# Patient Record
Sex: FEMALE | Race: WHITE | NOT HISPANIC OR LATINO | Employment: FULL TIME | URBAN - METROPOLITAN AREA
[De-identification: names, ages, dates, MRNs, and addresses within clinical notes are randomized per-mention and may not be internally consistent; named-entity substitution may affect disease eponyms.]

---

## 2024-03-25 ENCOUNTER — HOSPITAL ENCOUNTER (EMERGENCY)
Facility: CLINIC | Age: 29
Discharge: HOME | End: 2024-03-26
Attending: EMERGENCY MEDICINE | Admitting: EMERGENCY MEDICINE
Payer: OTHER GOVERNMENT

## 2024-03-25 DIAGNOSIS — F10.920 ALCOHOLIC INTOXICATION WITHOUT COMPLICATION (H): ICD-10-CM

## 2024-03-25 PROBLEM — F10.90 ALCOHOL USE DISORDER: Status: ACTIVE | Noted: 2024-03-25

## 2024-03-25 PROBLEM — F41.9 ANXIETY: Status: ACTIVE | Noted: 2024-03-25

## 2024-03-25 PROCEDURE — 250N000013 HC RX MED GY IP 250 OP 250 PS 637: Performed by: EMERGENCY MEDICINE

## 2024-03-25 PROCEDURE — 250N000013 HC RX MED GY IP 250 OP 250 PS 637: Performed by: NURSE PRACTITIONER

## 2024-03-25 PROCEDURE — 99285 EMERGENCY DEPT VISIT HI MDM: CPT

## 2024-03-25 PROCEDURE — 99283 EMERGENCY DEPT VISIT LOW MDM: CPT | Performed by: NURSE PRACTITIONER

## 2024-03-25 RX ORDER — HYDROXYZINE HYDROCHLORIDE 25 MG/1
25 TABLET, FILM COATED ORAL 2 TIMES DAILY PRN
COMMUNITY

## 2024-03-25 RX ORDER — PROPRANOLOL HYDROCHLORIDE 10 MG/1
10 TABLET ORAL 2 TIMES DAILY
Status: DISCONTINUED | OUTPATIENT
Start: 2024-03-25 | End: 2024-03-26 | Stop reason: HOSPADM

## 2024-03-25 RX ORDER — OLANZAPINE 10 MG/1
10 TABLET, ORALLY DISINTEGRATING ORAL AT BEDTIME
Status: DISCONTINUED | OUTPATIENT
Start: 2024-03-25 | End: 2024-03-25

## 2024-03-25 RX ORDER — HYDROXYZINE HYDROCHLORIDE 25 MG/1
25 TABLET, FILM COATED ORAL 2 TIMES DAILY PRN
Status: DISCONTINUED | OUTPATIENT
Start: 2024-03-25 | End: 2024-03-26 | Stop reason: HOSPADM

## 2024-03-25 RX ORDER — PROPRANOLOL HYDROCHLORIDE 10 MG/1
10 TABLET ORAL 2 TIMES DAILY
COMMUNITY

## 2024-03-25 RX ORDER — ACETAMINOPHEN 325 MG/1
650 TABLET ORAL EVERY 6 HOURS PRN
Status: DISCONTINUED | OUTPATIENT
Start: 2024-03-25 | End: 2024-03-26 | Stop reason: HOSPADM

## 2024-03-25 RX ADMIN — NICOTINE POLACRILEX 2 MG: 2 GUM, CHEWING ORAL at 00:30

## 2024-03-25 RX ADMIN — OLANZAPINE 10 MG: 10 TABLET, ORALLY DISINTEGRATING ORAL at 02:27

## 2024-03-25 RX ADMIN — HYDROXYZINE HYDROCHLORIDE 25 MG: 25 TABLET, FILM COATED ORAL at 21:14

## 2024-03-25 RX ADMIN — HYDROXYZINE HYDROCHLORIDE 25 MG: 25 TABLET, FILM COATED ORAL at 14:27

## 2024-03-25 NOTE — ED PROVIDER NOTES
History     Chief Complaint:  Alcohol Intoxication       HPI   Theresa Jackson is a 28 year old female, with a hx of bipolar disorder, was brought in by EMS after being denied boarding at the airport due to intoxication. The patient admits to drinking tonight prior to boarding her flight, per EMS. Patient states she is having a mental breakdown and has been exhibiting suicidal ideation per EMS. EMS reports the patient has had a suicide attempt in June.     Independent Historian:   EMS and the patient as reported above in the HPI.     Medications:    The patient is not currently taking any prescribed medications.    Past Medical History:    Bipolar disorder     Past Surgical History:    None.      Physical Exam   Patient Vitals for the past 24 hrs:   BP Temp Temp src Pulse Resp SpO2 Weight   03/25/24 0019 128/74 97.5  F (36.4  C) Temporal 100 20 100 % 72.6 kg (160 lb)        Physical Exam  General: In 5 point restraints.  Screaming.  Head:  The scalp, face, and head appear normal  Mouth/Throat: Mucus membranes are moist  CV:  Regular rate    Normal S1 and S2  No pathological murmur   Resp:  Breath sounds clear and equal bilaterally    Non-labored, no retractions or accessory muscle use    No coarseness    No wheezing   GI:  Abdomen is soft, no rigidity    No tenderness to palpation  MS:  Normal motor assessment of all extremities.    Good capillary refill noted.  Skin:  No rash or lesions noted.  Neuro:   Does not cooperate with exam but does move all extremities with good strength.  Speech is clear and fluent.  Psych: Labile mood consistent with reported intoxication.  Patient does report suicidal thoughts.  Poor insight.  Poor judgment.      Emergency Department Course     Emergency Department Course & Assessments:    Interventions:  Medications   nicotine (NICORETTE) gum 2 mg (2 mg Buccal $Given 3/25/24 0030)   OLANZapine zydis (zyPREXA) ODT tab 10 mg (10 mg Oral $Given 3/25/24 8509)      Assessments:  0004     I obtained history and examined the patient as noted above.     Independent Interpretation (X-rays, CTs, rhythm strip):  None    Consultations/Discussion of Management or Tests:  None        Social Determinants of Health affecting care:   None    Disposition:  Care of the patient was transferred to my colleague pending sobriety and DEC assessment.     Impression & Plan      Medical Decision Making:    Theresa Jackson is a 28 year old female who presents for evaluation of alcohol intoxication.  They are intoxicated here in ED by lab evaluation.   Patient has no history of Delirium tremens or alcohol withdrawal seizures. There are no signs of trauma related to alcohol use and no further workup is needed including head CT.     The patient also reported suicidal thoughts.  She will be assessed by one of our mental health assessors this morning.  She is signed out to my partner pending DEC assessment.    Diagnosis:    ICD-10-CM    1. Alcoholic intoxication without complication (H24)  F10.920       2. Verbalizes suicidal thoughts  R45.851              Scribe Disclosure:  Scribe Disclosure:  I, Arcadio Arango, am serving as a scribe at 12:19 AM on 3/25/2024 to document services personally performed by Munira Frazier MD based on my observations and the provider's statements to me.  Munira Frazier MD Taxman, Karah M, MD  03/25/24 7320

## 2024-03-25 NOTE — ED NOTES
Bed: ST02  Expected date:   Expected time:   Means of arrival:   Comments:  [age]F restrained, ETOH, from airport

## 2024-03-25 NOTE — ED NOTES
Patient took her prescribed medications Propanolol 10 mg and hydroxyzine under staff supervision and medication returned to her belonging bag and locked back up.

## 2024-03-25 NOTE — ED NOTES
Patient reported that she is here in [place] to visit her son, and she had a couple of drinks, and the airline did not let her fly.  She is still very tired and sleeping and does not want to eat breakfast. Commander Mccarthy called her number is , and  .  Patient is supposed to be flying to [facility] or [facility]. They will margarita back with information.

## 2024-03-25 NOTE — ED NOTES
3/25/2024  Theresa Jackson 1995     Writer consulted with ED nurse,on this date at  5:25 AM. It was determined that pt would not benefit from assessment at this time due to Pt unable able to participate in assessment due to sleeping as she was recently given Zyprexa.     ED will check in with [facility] staff when pt is ready and able to participate in assessment.     NIRALI KhalilSW

## 2024-03-25 NOTE — ED PROVIDER NOTES
"[facility] Unit - Psychiatry  Combined Observation Note and Discharge Summary  [facility] Emergency Department  Observation Initiation Date: Mar 25, 2024    Theresa Jackson MRN: [id number]   Age: 28 year old YOB: 1995     History     Chief Complaint   Patient presents with    Alcohol Intoxication     HPI  Theresa Jackson is a 28 year old female with no notable past medical history who presented to the emergency department for evaluation from the airport after she was denied boarding of her plane due to appearing intoxicated. Specific details are unknown. Patient was medically evaluated in the emergency department and determined to be medically stable for transfer to [facility] for further psychiatric assessment. Here at [facility], patient was not interested in discussing events leading to emergency department visit. She is no longer intoxicated. She endorses a plan to return to [place] when she is able to leave the hospital, was in [place] visiting her son. She reports that she has psychiatry and therapy arranged in [place]. She presently denies any suicidal or homicidal thinking. There is no evidence of psychosis or titus. She was encouraged to follow-up with providers in [place].       Past Medical History  No past medical history on file.  No past surgical history on file.  hydrOXYzine HCl (ATARAX) 25 MG tablet  propranolol (INDERAL) 10 MG tablet      Not on File  Family History  No family history on file.  Social History       Past medical history, past surgical history, medications, allergies, family history, and social history were reviewed with the patient. No additional pertinent items.       Review of Systems  A medically appropriate review of systems was performed with pertinent positives and negatives noted in the HPI, and all other systems negative.    Physical Examination   BP: 128/74  Pulse: 100  Temp: 97.5  F (36.4  C)  Resp: 20  Height: 165.1 cm (5' 5\")  Weight: 72.6 kg (160 lb)  SpO2: 100 " %    Physical Exam  General: Appears stated age.   Neuro: Alert and fully oriented. Extremities appear to demonstrate normal strength on visual inspection.   Integumentary/Skin: no rash visualized, normal color    Psychiatric Examination   Appearance: awake, alert, adequately groomed, appeared as age stated, and casually dressed  Attitude:  cooperative  Eye Contact:  good  Mood:   fine  Affect:  mood congruent and intensity is normal  Speech:  clear, coherent and normal prosody  Psychomotor Behavior:  no evidence of tardive dyskinesia, dystonia, or tics  Thought Process:  linear and goal oriented  Associations:  no loose associations  Thought Content:  no evidence of suicidal ideation or homicidal ideation, no evidence of psychotic thought, no auditory hallucinations present, and no visual hallucinations present  Insight:  fair  Judgement:  fair  Oriented to:  time, person, and place  Attention Span and Concentration:  intact  Recent and Remote Memory:  intact  Language: able to name/identify objects without impairment  Fund of Knowledge: intact with awareness of current and past events    ED Course        Labs Ordered and Resulted from Time of ED Arrival to Time of ED Departure - No data to display    Assessments & Plan (with Medical Decision Making)   Patient presenting with alcohol intoxication from the airport after she was denied boarding due to intoxication. There was mention of suicidal thoughts last evening, which she currently denies. She has mental health services in [place] and will follow-up there. Nursing notes reviewed noting no acute issues.     I have reviewed the assessment completed by the [facility].     During the observation period, the patient did not require medications for agitation, and did not require restraints/seclusion for patient and/or provider safety.    The patient was found to have a psychiatric condition that would benefit from an observation stay in the emergency department for further  psychiatric stabilization and/or coordination of a safe disposition. The observation plan includes serial assessments of psychiatric condition, potential administration of medications if indicated, further disposition pending the patient's psychiatric course during the monitoring period.     Preliminary diagnosis:    ICD-10-CM    1. Alcoholic intoxication without complication (H24)  F10.920            Treatment Plan:  - Patient requests discharge from the hospital. She does not currently meet criteria for an involuntary hold - she is not intoxicated. She denies suicidal or homicidal thinking. No evidence of psychosis or titus. She will be discharged.   - Continue propranolol and hydroxyzine PRN  - Continue to follow-up with outpatient psychiatrist and psychotherapist in [place]  - Abstain from alcohol or any non-prescribed mind or mood-altering substances      ADDENDUM @ 0857 3/25/24:  Patient agrees to stay until 0800 on 3/26/24 when she will be picked up by two sergeants from the . They will accompany patient back to [place]. She remains at [facility] voluntarily and may be discharged if she demands to leave prior to 0800.         After the period in observation care, the patient's circumstances and mental state were safe for outpatient management. After counseling on the diagnosis, work-up, and treatment plan, the patient was discharged. Close follow-up with a psychiatrist and/or therapist was recommended and community psychiatric resources were provided. Patient is to return to the ED if any urgent or potentially life-threatening concerns.      At the time of discharge, the patient's acute suicide risk was determined to be low due to the following factors: Reduction in the intensity of mood/anxiety symptoms that preceded the admission, denial of suicidal thoughts, denies feeling helpless or helpless, not currently under the influence of alcohol or illicit substances, denies experiencing command hallucinations,  no immediate access to firearms. The patient's acute risk could be higher if noncompliant with their treatment plan, medications, follow-up appointments or using illicit substances or alcohol. Protective factors include: social supports, stable housing, employment    --  Alexis Breen CNP   [facility] EMERGENCY DEPT  [facility] Unit         Alexis Breen CNP  03/25/24 6751       Alexis Breen CNP  03/25/24 1415

## 2024-03-25 NOTE — ED NOTES
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  She reports that two Staff Sgts will arriving at 0800 on 3/26/2024 to escort patient back to treatment at the recovery unit in [place].  The Staff Sgts will be [place].   If patient or staff have questions, Fredis can be reached on her government cell phone of [phone].     Patient is not aware of this yet as she is currently sleeping in her recliner and RN recommends letting patient sleep as she was irritable in the transfer from ED to [facility].       Jean Meyer, NERISSA, [facility]  Licensed Mental Health Professional (LMHP)  [facility], [phone]

## 2024-03-25 NOTE — ED NOTES
Patient given a phone call on her cell phone in a secure area away from other patients for help in contacting her job.

## 2024-03-25 NOTE — ED PROVIDER NOTES
ED signout    Patient arrives intoxicated making suicidal statements.  Was pending DEC evaluation.  Ultimately transferred to [facility] for further evaluation.     Amanda Mason,   03/25/24 104

## 2024-03-25 NOTE — ED NOTES
Patient escorted from  common area where he is sleeping and helped to her room since she is drowsy. Pt escorted to her room due to safety concerns with another patient pacing in the common area. Patient advised her door is being closed for her privacy while she is sleeping but it is not locked. Patient verbalized understanding.

## 2024-03-25 NOTE — CONSULTS
"Diagnostic Evaluation Consultation  Crisis Assessment    Patient Name: Theresa Jackson  Age:  28 year old  Legal Sex: female  Gender Identity: female  Pronouns: she/her  Race: Data Unavailable  Ethnicity: Data Unavailable  Language: Data Unavailable      Patient was assessed: In person Crisis Assessment Start Time: 1315 Crisis Assessment Stop Time: 1320  Patient location: [facility] EMERGENCY DEPT                             [id number]    Referral Data and Chief Complaint  Theresa Jackson presents to the ED via EMS. Patient is presenting to the ED for the following concerns: Intoxication, Worsening psychosocial stress.       Factors that make the mental health crisis life threatening or complex are:  Patient declines to meet with writer for DEC Assessment as she is \"confused as to why I am still here in the hospital.\"  Writer reviewed notes from time of arrival in the ED where patient was noted to be intoxicated at the airport, refused admittance on the plane, and making suicidal statements.  Patient denies suicidal ideations then and now.  Writer informed patient that Fredis has been in contact with hospital staff this morning to let us know that two Staff Sgts will be flying here [facility], getting patient in the morning and escorting her back to [place].  Patient stated her brother-in-law was already en route from [place] to take her back to her [place] for a 1300 pre-op appointment tomorrow.  Patient was given Fredis's work phone number so she can communicate/coordinate with her about escorted transportation need.  Patient presents as flat affect, soft spoken, guarded and annoyed..      Informed Consent and Assessment Methods  Explained the crisis assessment process, including applicable information disclosures and limits to confidentiality, assessed understanding of the process, and obtained consent to proceed with the assessment.  Assessment methods included conducting a formal interview with " patient, review of medical records, collaboration with medical staff, and obtaining relevant collateral information from family and community providers when available.  : done     Patient response to interventions: unacceptance expressed  Coping skills were attempted to reduce the crisis:  unable to assess as patient did not participate in the assessment     History of the Crisis   Patient was at [facility] airport, got intoxicated and was refused boarding to the plane trying to return to [place].    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[employer]  Source of Income:   (unable to assess as patient did not participate in the assessment)  Financial Environmental Concerns:   (unable to assess as patient did not participate in the assessment)  Current Hobbies:   (unable to assess as patient did not participate in the assessment)  Barriers in Personal Life:  mental health concerns (alcohol use)    Significant Clinical History  Current Anxiety Symptoms:  anxious  Current Depression/Trauma:  impaired decision making, sadness  Current Somatic Symptoms:  anxious  Current Psychosis/Thought Disturbance:   (none observed)  Current Eating Symptoms:   (no changes)  Chemical Use History:  Alcohol: Binge  Last Use:: 03/24/24  Benzodiazepines:  (unable to assess as patient did not participate in the assessment)  Opiates:  (unable to assess as patient did not participate in the assessment)  Cocaine:  (unable to assess as patient did not participate in the assessment)  Marijuana:  (unable to assess as patient did not participate in the assessment)  Other Use:  (unable to assess as patient did not participate in the assessment)  Withdrawal Symptoms:  (none noted)  Addictions:  (none reported)   Past diagnosis:  Depression, Anxiety Disorder  Family history:  No known history of mental health or chemical health concerns  Past treatment:   Individual therapy, Psychiatric Medication Management  Details of most recent treatment:  She reports having therapy, psychiatry, and social work established in [place].       Collateral Information  Is there collateral information: No (no contacts on file and declined to provide contact information)        Risk Assessment  [place] Suicide Severity Rating Scale Full Clinical Version:  Suicidal Ideation  Q6 Suicide Behavior (Lifetime): yes          [place] Suicide Severity Rating Scale Recent:   Suicidal Ideation (Recent)  Q1 Wished to be Dead (Past Month): no  Q2 Suicidal Thoughts (Past Month): no  Q3 Suicidal Thought Method: no  Q4 Suicidal Intent without Specific Plan: yes  Q5 Suicide Intent with Specific Plan: no  Within the Past 3 Months?: yes  Level of Risk per Screen: no risks indicated     Suicidal Behavior (Recent)  Actual Attempt (Past 3 Months): No  Has subject engaged in non-suicidal self-injurious behavior? (Past 3 Months): No  Interrupted Attempts (Past 3 Months): No  Aborted or Self-Interrupted Attempt (Past 3 Months): No  Preparatory Acts or Behavior (Past 3 Months): No    Environmental or Psychosocial Events:  (unable to assess as patient did not participate in the assessment)  Protective Factors: Protective Factors: strong bond to family unit, community support, or employment, responsibilities and duties to others, including pets and children, lives in a responsibly safe and stable environment, good treatment engagement, supportive ongoing medical and mental health care relationships    Does the patient have thoughts of harming others? Feels Like Hurting Others: no  Previous Attempt to Hurt Others: no  Is the patient engaging in sexually inappropriate behavior?: no    Is the patient engaging in sexually inappropriate behavior?  no        Mental Status Exam   Affect: Flat  Appearance: Appropriate  Attention Span/Concentration: Attentive  Eye Contact: Engaged    Fund of Knowledge: Appropriate    Language /Speech Content: Fluent  Language /Speech Volume: Soft  Language /Speech Rate/Productions: Normal  Recent Memory: Variable  Remote Memory: Intact  Mood: Anxious  Orientation to Person: Yes   Orientation to Place: Yes  Orientation to Time of Day: Yes  Orientation to Date: Yes     Situation (Do they understand why they are here?): Yes  Psychomotor Behavior: Normal  Thought Content: Clear  Thought Form: Intact     Medication  Psychotropic medications:   Medication Orders - Psychiatric (From admission, onward)      Start     Dose/Rate Route Frequency Ordered Stop    03/25/24 0215  OLANZapine zydis (zyPREXA) ODT tab 10 mg         10 mg Oral AT BEDTIME 03/25/24 0210      03/25/24 0028  nicotine (NICORETTE) gum 2 mg         2 mg Buccal EVERY 1 HOUR PRN 03/25/24 0028               Current Care Team  No care team member to display    Diagnosis  Patient Active Problem List   Diagnosis Code    Anxiety F41.9    Alcohol use disorder F10.90       Primary Problem This Admission  Active Hospital Problems    Anxiety      Alcohol use disorder        Clinical Summary and Substantiation of Recommendations   Patient is declining to participate in crisis assessment as she is confused as to why she is here in the hospital.  Patient was informed that she was intoxicated at the airport, was not allowed to board the plane, and brought to the ED due to  making suicidal statements.  Patient reports she is not suicidal and has mental health services established in [place].  Patient was informed that the , Fredis, has been in communciation with us regarding sending two Staff Sgts here to pick her up in the morning and escort her back to [place].  Patient briefly met with provider as well and declined needing any further support or assistance.  Patient is agreement to stay on [facility] voluntarily until [employer] escort arrives at 0800 on 3/26/24 to bring her back to [place].        Disposition  Recommended disposition: Individual  Therapy, Medication Management        Reviewed case and recommendations with attending provider. Attending Name: Iam Breen CNP       Attending concurs with disposition: yes       Patient and/or validated legal guardian concurs with disposition:  yes       Final disposition:  discharge at 0800 on 3/26/24 via [facility] Staff Sgt escort    Legal status on admission: Voluntary/Patient has signed consent for treatment    Assessment Details   Total duration spent with the patient: 5 min     CPT code(s) utilized: Non-Billable    NERISSA Hines, LICSW  Licensed Mental Health Professional (LMHP)  [facility], [phone]

## 2024-03-25 NOTE — ED NOTES
Patient is agreeable to staying on [facility] until 0800 on 3/26/24 when two Staff Sgts from the [employer] arrive to escort her back to [place].  If patient is asking to discharge after this note entry and before [employer] escort arrives, we are requested to call patient's [employer] Chain of Command to inform them of patient's discharge.  Company Commander in the next in chain of command:  office number is [phone] and cell phone is [phone].    NERISSA Hines, [facility] Hospital[facility]  Licensed Mental Health Professional (LMHP)  [facility], [phone]

## 2024-03-25 NOTE — ED NOTES
Bed: [facility]  Expected date:   Expected time:   Means of arrival:   Comments:  STAB 2

## 2024-03-25 NOTE — ED TRIAGE NOTES
"Patient presents to ED via EMS. Per report, patient comes from the airport where she was refused boarding related to ETOH intoxication. Resisted transport and placed on a hold and in restraints for transport. States she is \"Having a mental breakdown that caused her to drink\"         "

## 2024-03-25 NOTE — ED NOTES
Patient is awake, she is trying to eat breakfast, she appears to be extremely tired, she just spoke with her commander and she is upset.
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
## 2024-03-25 NOTE — ED NOTES
Patient laying down in the  common area awake with a blanket covering her whole she is trying to take a nap.

## 2024-03-25 NOTE — PROGRESS NOTES
[facility]  ED to EMPATH Checklist:      Goal for EMPATH: Bipolar    Current Behavior: Sad    Safety Concerns: None    Legal Hold Status: [facility]    Medically Cleared by ED provider: Yes    Patient Therapeutically Searched: Done    Belongings: In room locker    Independent Ambulation at Baseline: Yes    Participates in Care/Conversation: Yes    Patient Informed about EMPATH: Yes    DEC: yes    Patient Ready to be Transferred to EMPATH? Yes

## 2024-03-25 NOTE — PROGRESS NOTES
"Patient is making a phone call to arrange a flight back to [place], she stated  \" I am mad at my self\"   Patient will be seeing by a DEC  .  "

## 2024-03-26 VITALS
HEIGHT: 65 IN | WEIGHT: 160 LBS | SYSTOLIC BLOOD PRESSURE: 108 MMHG | OXYGEN SATURATION: 98 % | HEART RATE: 68 BPM | TEMPERATURE: 98.1 F | BODY MASS INDEX: 26.66 KG/M2 | RESPIRATION RATE: 18 BRPM | DIASTOLIC BLOOD PRESSURE: 60 MMHG

## 2024-03-26 PROCEDURE — 250N000013 HC RX MED GY IP 250 OP 250 PS 637: Performed by: NURSE PRACTITIONER

## 2024-03-26 RX ADMIN — PROPRANOLOL HYDROCHLORIDE 10 MG: 10 TABLET ORAL at 08:01

## 2024-03-26 ASSESSMENT — ACTIVITIES OF DAILY LIVING (ADL)
ADLS_ACUITY_SCORE: 35

## 2024-03-26 NOTE — PROGRESS NOTES
Writer checked in with patient as she was eating breakfast to see if there was anything else from a therapy/social work standpoint needed.  Patient states she is good this morning.  Writer informed patient that RN was on the phone now coordinating with one of the Staff Sgts giving them direction on how to pick patient up when they get here.   Patient presented with bright affect, positive mood, and engaged.     Jean Meyer, NERISSA, LICSW

## 2024-03-26 NOTE — ED NOTES
Patient resting in lounge this shift. Eating, drinking and taking medications. Pleasant and cooperative with staff and peers. Patient showered this shift and changed clothes. Patient said she is ready for the plane ride back to [place] tomorrow. She regrets drinking at the airport after maintaining her sobriety. She is looking forward to May when she will be discharged from the  & can move back to [place] to be close to her three year old son.

## 2024-03-26 NOTE — DISCHARGE INSTRUCTIONS
Aftercare Plan  If I am feeling unsafe or I am in a crisis, I will:   Contact my established care providers   Call the National Suicide Prevention Lifeline: 988  Go to the nearest emergency room   Call 911     Continue to meet with your established providers in [place], following all recommendations of the [employer] as well    -I will abstain from all mood altering chemicals not currently prescribed to me       -I will attend scheduled mental health therapy and psychiatric appointments and follow all recommendations      -I will commit to 30 minutes of self care daily - this can be as simple as taking a shower, going for a walk, cooking a meal, read, writing, etc      -I will practice square breathing when I begin to feel anxious - in breath through the nose for the count of 4 and the first line on the square. Out breath through the mouth for the count of 4 for the second line of the square. Repeat to complete the square. Repeat the square as many times as needed.      - I will use distraction skills of: going for walks, watching TV, spending time outside, calling a friend or family member      -Use community resources, including hotline numbers, [facility] crisis and support meetings      -Maintain a daily schedule/routine      -Practice deep breathing skills      -Download a meditation orlin and spend 15-20 minutes per day mediating/relaxing. Some apps to download include: Calm, Headspace and Insight Timer. All 3 of these apps have free version     Additional Information  Today you were seen by a licensed mental health professional through Triage and Transition services, [facility]  for a crisis assessment in the Emergency Department at [facility].  It is recommended that you follow up with your established providers (psychiatrist, mental health therapist, and/or primary care doctor - as relevant) as soon as possible. Coordinators from [facility] will be calling you in the next 24-48 hours to ensure  that you have the resources you need.  You can also contact [facility] coordinators directly at [phone]. You may have been scheduled for or offered an appointment with a mental health provider. [facility] maintains an extensive network of licensed behavioral health providers to connect patients with the services they need.  We do not charge providers a fee to participate in our referral network.  We match patients with providers based on a patient's specific needs, insurance coverage, and location.  Our first effort will be to refer you to a provider within your care system, and will utilize providers outside your care system as needed.

## 2024-03-26 NOTE — PROGRESS NOTES
Triage & Transition Services, Extended Care     Client Name: Theresa Jackson    Date: March 25, 2024    Patient was seen    Mode of Assessment:      Service Type: (P) refused to attend group session  Session Start Time:  (P) 1930    Session End Time: (P) 2000  Session Length: (P) 30  Site Location: [facility] EMERGENCY DEPT                             [id number]  Total Number ofAttendees: (P) 3  Topic:   (P) coping skills/lifestyle management   Response: (P) other (see comments) (pt declined to attend group)     Shilpa Block, [facility]   Licensed Mental Health Professional (LMHP), Extended Care  [phone]

## 2024-03-26 NOTE — PROGRESS NOTES
Pleasant, cooperative mood is calm, patient denies any suicidal or homicidal ideation.   She is waiting for her ride to arrive.   Reviewed discharge information with patient.    called, they will be here to pick her up shortly.